# Patient Record
Sex: FEMALE | Race: WHITE
[De-identification: names, ages, dates, MRNs, and addresses within clinical notes are randomized per-mention and may not be internally consistent; named-entity substitution may affect disease eponyms.]

---

## 2022-06-29 ENCOUNTER — HOSPITAL ENCOUNTER (INPATIENT)
Dept: HOSPITAL 54 - ER | Age: 87
LOS: 5 days | DRG: 682 | End: 2022-07-04
Attending: INTERNAL MEDICINE | Admitting: INTERNAL MEDICINE
Payer: MEDICARE

## 2022-06-29 VITALS — BODY MASS INDEX: 23.22 KG/M2 | HEIGHT: 64 IN | WEIGHT: 136 LBS

## 2022-06-29 DIAGNOSIS — L89.896: ICD-10-CM

## 2022-06-29 DIAGNOSIS — R74.01: ICD-10-CM

## 2022-06-29 DIAGNOSIS — Z95.0: ICD-10-CM

## 2022-06-29 DIAGNOSIS — E86.0: ICD-10-CM

## 2022-06-29 DIAGNOSIS — N18.9: ICD-10-CM

## 2022-06-29 DIAGNOSIS — L89.523: ICD-10-CM

## 2022-06-29 DIAGNOSIS — Z79.899: ICD-10-CM

## 2022-06-29 DIAGNOSIS — M16.12: ICD-10-CM

## 2022-06-29 DIAGNOSIS — Z96.641: ICD-10-CM

## 2022-06-29 DIAGNOSIS — Z79.01: ICD-10-CM
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
## 2022-06-29 DIAGNOSIS — I27.20: ICD-10-CM

## 2022-06-29 DIAGNOSIS — E44.0: ICD-10-CM

## 2022-06-29 DIAGNOSIS — D64.9: ICD-10-CM

## 2022-06-29 DIAGNOSIS — I12.9: ICD-10-CM

## 2022-06-29 DIAGNOSIS — E78.5: ICD-10-CM

## 2022-06-29 DIAGNOSIS — K80.20: ICD-10-CM

## 2022-06-29 DIAGNOSIS — K76.1: ICD-10-CM

## 2022-06-29 DIAGNOSIS — Z91.81: ICD-10-CM

## 2022-06-29 DIAGNOSIS — G92.8: ICD-10-CM
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
## 2022-06-29 DIAGNOSIS — Z86.73: ICD-10-CM

## 2022-06-29 DIAGNOSIS — Z20.822: ICD-10-CM

## 2022-06-29 DIAGNOSIS — E83.52: ICD-10-CM

## 2022-06-29 DIAGNOSIS — I48.0: ICD-10-CM

## 2022-06-29 DIAGNOSIS — E88.09: ICD-10-CM

## 2022-06-29 DIAGNOSIS — G93.89: ICD-10-CM

## 2022-06-29 DIAGNOSIS — M62.561: ICD-10-CM

## 2022-06-29 DIAGNOSIS — L89.626: ICD-10-CM

## 2022-06-29 DIAGNOSIS — M71.20: ICD-10-CM

## 2022-06-29 DIAGNOSIS — N17.9: Primary | ICD-10-CM

## 2022-06-29 DIAGNOSIS — E80.6: ICD-10-CM

## 2022-06-29 DIAGNOSIS — I82.411: ICD-10-CM

## 2022-06-29 DIAGNOSIS — E87.0: ICD-10-CM

## 2022-06-29 DIAGNOSIS — K57.30: ICD-10-CM

## 2022-06-29 DIAGNOSIS — I21.A1: ICD-10-CM

## 2022-06-29 DIAGNOSIS — L89.616: ICD-10-CM

## 2022-06-29 DIAGNOSIS — K76.89: ICD-10-CM

## 2022-06-29 DIAGNOSIS — M62.562: ICD-10-CM

## 2022-06-29 LAB
ALBUMIN SERPL BCP-MCNC: 2.3 G/DL (ref 3.4–5)
ALP SERPL-CCNC: 131 U/L (ref 46–116)
ALT SERPL W P-5'-P-CCNC: 109 U/L (ref 12–78)
AST SERPL W P-5'-P-CCNC: 133 U/L (ref 15–37)
BASOPHILS # BLD AUTO: 0 K/UL (ref 0–0.2)
BASOPHILS NFR BLD AUTO: 0.2 % (ref 0–2)
BILIRUB DIRECT SERPL-MCNC: 5.5 MG/DL (ref 0–0.2)
BILIRUB SERPL-MCNC: 7.4 MG/DL (ref 0.2–1)
BILIRUB UR QL STRIP: (no result)
BUN SERPL-MCNC: 90 MG/DL (ref 7–18)
CALCIUM SERPL-MCNC: 10.9 MG/DL (ref 8.5–10.1)
CHLORIDE SERPL-SCNC: 117 MMOL/L (ref 98–107)
CO2 SERPL-SCNC: 28 MMOL/L (ref 21–32)
COLOR UR: (no result)
CREAT SERPL-MCNC: 2 MG/DL (ref 0.6–1.3)
EOSINOPHIL NFR BLD AUTO: 0 % (ref 0–6)
GLUCOSE SERPL-MCNC: 132 MG/DL (ref 74–106)
GLUCOSE UR STRIP-MCNC: NEGATIVE MG/DL
HCT VFR BLD AUTO: 39 % (ref 33–45)
HGB BLD-MCNC: 12.4 G/DL (ref 11.5–14.8)
LEUKOCYTE ESTERASE UR QL STRIP: NEGATIVE
LYMPHOCYTES NFR BLD AUTO: 0.9 K/UL (ref 0.8–4.8)
LYMPHOCYTES NFR BLD AUTO: 8 % (ref 20–44)
MCHC RBC AUTO-ENTMCNC: 32 G/DL (ref 31–36)
MCV RBC AUTO: 95 FL (ref 82–100)
MONOCYTES NFR BLD AUTO: 0.8 K/UL (ref 0.1–1.3)
MONOCYTES NFR BLD AUTO: 7.5 % (ref 2–12)
NEUTROPHILS # BLD AUTO: 9.5 K/UL (ref 1.8–8.9)
NEUTROPHILS NFR BLD AUTO: 84.3 % (ref 43–81)
NITRITE UR QL STRIP: NEGATIVE
PH UR STRIP: 5.5 [PH] (ref 5–8)
PLATELET # BLD AUTO: 229 K/UL (ref 150–450)
POTASSIUM SERPL-SCNC: 4.2 MMOL/L (ref 3.5–5.1)
PROT SERPL-MCNC: 7.1 G/DL (ref 6.4–8.2)
PROT UR QL STRIP: (no result) MG/DL
RBC # BLD AUTO: 4.12 MIL/UL (ref 4–5.2)
RBC #/AREA URNS HPF: (no result) /HPF (ref 0–2)
SODIUM SERPL-SCNC: 156 MMOL/L (ref 136–145)
UROBILINOGEN UR STRIP-MCNC: 0.2 EU/DL
WBC #/AREA URNS HPF: (no result) /HPF
WBC #/AREA URNS HPF: (no result) /HPF (ref 0–3)
WBC NRBC COR # BLD AUTO: 11.3 K/UL (ref 4.3–11)

## 2022-06-29 PROCEDURE — A6403 STERILE GAUZE>16 <= 48 SQ IN: HCPCS

## 2022-06-29 PROCEDURE — A6253 ABSORPT DRG > 48 SQ IN W/O B: HCPCS

## 2022-06-29 PROCEDURE — C9803 HOPD COVID-19 SPEC COLLECT: HCPCS

## 2022-06-29 PROCEDURE — G0378 HOSPITAL OBSERVATION PER HR: HCPCS

## 2022-06-29 NOTE — NUR
ATTEMPTED NURSING SWALLOW EVAL AT BEDSIDE W/ PATIENT'S DTR TRANSLATING FOR 
PATIENT. PT UNABLE TO SAFELY SWALLOW AS PT IS UNABLE TO FOLLOW COMMANDS. DR. MARTINEZ MADE AWARE.

## 2022-06-29 NOTE — NUR
TELE RN NOTE

RECEIVED PT FROM ER VIA riskmethodsHANY. ADMITTING DX GLF, FREEMAN, NSTEMI, FTT. PT IS DROWSY, AROUSABLE 
TO PAINFUL STIMULATION. NO DISTRESS OR DISCOMFORT NOTED. PT MOAN ON REPOSITION IN BED. PT ON 
O2 2L VIA N/C O2 %, KEPT HOB ELEVATED. SKIN ASSESSMENT DONE. PT HAS MULTIPLE SKIN AND 
BRUISES ON BODIES. PICTURES TAKEN AND PLACE THEM IN THE CHART. LT UPPER ARM IV SITE 
INFILTRATED. DC'D THE LINE AND SECURED THE SITE WITH 2X2 GAUZE. KEPT THE ARM ELEVATED. SL IN 
RT HAND #20 G INTACT AND PATENT. WOUND CONSULT PENDING. ADMITTING ORDERS CHECKED AND CARRIED 
OUT. INCONTINENCE CARE GIVEN. KEPT HER DRY AND CLEAN. ALL NEEDS ATTENDED. VSS. CONTINUE TO 
MONITOR HER.
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
## 2022-06-30 VITALS — DIASTOLIC BLOOD PRESSURE: 41 MMHG | SYSTOLIC BLOOD PRESSURE: 95 MMHG

## 2022-06-30 VITALS — SYSTOLIC BLOOD PRESSURE: 97 MMHG | DIASTOLIC BLOOD PRESSURE: 44 MMHG

## 2022-06-30 VITALS — DIASTOLIC BLOOD PRESSURE: 47 MMHG | SYSTOLIC BLOOD PRESSURE: 110 MMHG

## 2022-06-30 VITALS — DIASTOLIC BLOOD PRESSURE: 35 MMHG | SYSTOLIC BLOOD PRESSURE: 95 MMHG

## 2022-06-30 LAB
ALBUMIN SERPL BCP-MCNC: 1.7 G/DL (ref 3.4–5)
ALP SERPL-CCNC: 111 U/L (ref 46–116)
ALT SERPL W P-5'-P-CCNC: 99 U/L (ref 12–78)
AST SERPL W P-5'-P-CCNC: 95 U/L (ref 15–37)
BASOPHILS # BLD AUTO: 0 K/UL (ref 0–0.2)
BASOPHILS NFR BLD AUTO: 0 % (ref 0–2)
BILIRUB DIRECT SERPL-MCNC: 3.7 MG/DL (ref 0–0.2)
BILIRUB SERPL-MCNC: 4.5 MG/DL (ref 0.2–1)
BUN SERPL-MCNC: 94 MG/DL (ref 7–18)
CALCIUM SERPL-MCNC: 9.6 MG/DL (ref 8.5–10.1)
CHLORIDE SERPL-SCNC: 121 MMOL/L (ref 98–107)
CHOLEST SERPL-MCNC: 83 MG/DL (ref ?–200)
CO2 SERPL-SCNC: 26 MMOL/L (ref 21–32)
CREAT SERPL-MCNC: 1.9 MG/DL (ref 0.6–1.3)
EOSINOPHIL NFR BLD AUTO: 0.1 % (ref 0–6)
GLUCOSE SERPL-MCNC: 113 MG/DL (ref 74–106)
HCT VFR BLD AUTO: 34 % (ref 33–45)
HDLC SERPL-MCNC: 13 MG/DL (ref 40–60)
HGB BLD-MCNC: 10.9 G/DL (ref 11.5–14.8)
LDLC SERPL DIRECT ASSAY-MCNC: 44 MG/DL (ref 0–99)
LYMPHOCYTES NFR BLD AUTO: 0.8 K/UL (ref 0.8–4.8)
LYMPHOCYTES NFR BLD AUTO: 10.3 % (ref 20–44)
MAGNESIUM SERPL-MCNC: 2.5 MG/DL (ref 1.8–2.4)
MCHC RBC AUTO-ENTMCNC: 32 G/DL (ref 31–36)
MCV RBC AUTO: 94 FL (ref 82–100)
MONOCYTES NFR BLD AUTO: 0.6 K/UL (ref 0.1–1.3)
MONOCYTES NFR BLD AUTO: 8.2 % (ref 2–12)
NEUTROPHILS # BLD AUTO: 6.3 K/UL (ref 1.8–8.9)
NEUTROPHILS NFR BLD AUTO: 81.4 % (ref 43–81)
PHOSPHATE SERPL-MCNC: 3.9 MG/DL (ref 2.5–4.9)
PLATELET # BLD AUTO: 177 K/UL (ref 150–450)
POTASSIUM SERPL-SCNC: 4 MMOL/L (ref 3.5–5.1)
PROT SERPL-MCNC: 5.8 G/DL (ref 6.4–8.2)
RBC # BLD AUTO: 3.59 MIL/UL (ref 4–5.2)
SODIUM SERPL-SCNC: 155 MMOL/L (ref 136–145)
TRIGL SERPL-MCNC: 125 MG/DL (ref 30–150)
TSH SERPL DL<=0.005 MIU/L-ACNC: 0.15 UIU/ML (ref 0.36–3.74)
WBC NRBC COR # BLD AUTO: 7.7 K/UL (ref 4.3–11)

## 2022-06-30 RX ADMIN — APIXABAN SCH MG: 2.5 TABLET, FILM COATED ORAL at 18:05

## 2022-06-30 RX ADMIN — CLOTRIMAZOLE SCH GM: 1 CREAM TOPICAL at 17:55

## 2022-06-30 RX ADMIN — SODIUM HYPOCHLORITE SCH ML: 1.25 SOLUTION TOPICAL at 13:33

## 2022-06-30 RX ADMIN — SODIUM CHLORIDE PRN MLS/HR: 4.5 INJECTION, SOLUTION INTRAVENOUS at 14:49

## 2022-06-30 RX ADMIN — PANTOPRAZOLE SODIUM SCH MG: 40 TABLET, DELAYED RELEASE ORAL at 07:30

## 2022-06-30 RX ADMIN — SODIUM CHLORIDE PRN MLS/HR: 4.5 INJECTION, SOLUTION INTRAVENOUS at 00:05

## 2022-06-30 NOTE — NUR
RN NOTE



PATIENT WAS MORE AWAKE WITH FAMILY AT BEDSIDE. PATIENT WAS ABLE TO EAT AND TOLERATED 25% OF 
HER DINNER TRAY. PATIENT WAS ABLE TO TAKE HER 1700 MEDS, TOLERATED WELL.

## 2022-06-30 NOTE — NUR
Tele RN Opening Note

Pt received in bed, drowsy; would open eyes then fall back asleep; pt non-English speaking 
and is unable to answer when asked to verify her name and her . Pt is on 2L NC with O2sat 
92%; pt shows no s/s of resp distress, no SOB or cough, has non-labored and equal breathing. 
Pt attached to external monitor showing V-pacing with HR 69. Pt noted to have multiple 
wounds; will perform wound care as needed later in the shift. IV access on ROSALIO 20G and right 
hand 20G intact and patent; flushes easily with no resistance; D5W bolus has just completed 
and 1/2 NS was turned back on running at 100 ml/hr. Bed in lowest position, call light 
within reach, side rails up x3. Will continue to monitor throughout the night.
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
## 2022-06-30 NOTE — NUR
RN OPENING NOTE



RECEIVED PATIENT IN BED, SLEEPING. BREATHING EVEN AND UNLABORED. NO SIGNS OF ANY ACUTE 
DISTRESS NOTED AT THE TIME. ON O2 VIA NC AT 2LPM. PATIENT ON TELE MONITOR READING V-PACING 
WITH HR OF 70. NOTED WITH IV ACCESS ON RIGHT HAND #20G AND ROSALIO #20G, PATENT AND INTACT AND 
FLUSHED WELL. ALL SAFETY MEASURES IN PLACE. HOB ELEVATED WITH BED LOCKED IN LOWEST POSITION 
AND SIDE RAILS UP X 2. CALL LIGHT WITHIN REACH OF PATIENT. WILL CONTINUE TO MONITOR AND 
REASSESS PATIENT FOR ANY CHANGES DURING SHIFT.

## 2022-06-30 NOTE — NUR
WOUND CARE CONSULT: PT PRESENTS WITH MULTIPLE SKIN ISSUES AND WOUNDS INCLUDING MULTIPLE 
AREAS OF SKIN DISCOLORATION, LOWER EXTREMITY WOUNDS, NECROTIC DEEP TISSUE INJURIES IN 
EVOLUTION TO LEFT HIP AND LEFT SHOULDER, SACRAL SCAR WITH INCONTINENCE ASSOCIATED OPEN SKIN 
OVER SCAR, RASH TO BREASTRFOLDS AND PERINEUM, ALL PRESENT ON ADMISSION. PT IS INCONTINENT. 
RECOMMENDATIONS MADE FOR SKIN PROTECTION AND WOUND CARE. DISCUSSED WITH NURSING STAFF. 
SURGICAL CONSULT CALLED TO DR RENNY WILKERSON MD IN AGREEMENT WITH PLAN OF CARE. 

-------------------------------------------------------------------------------

Addendum: 06/30/22 at 1216 by LUDMILA VALLE WNDNU

-------------------------------------------------------------------------------

ADDITIONAL: PODIATRY CONSULT CALLED TO DR ÁLVAREZ FOR LOWER EXTREMITIES.

## 2022-06-30 NOTE — NUR
RN NOTE



PATIENT VERY DROWSY. BREAKFAST TRAY NOT GIVEN. AM MEDS HELD DUE TO DROWSINESS. MD INFORMED 
WITH ORDER FOR SWALLOW EVALUATION.

## 2022-06-30 NOTE — NUR
RN OPENING NOTE



NO SIGNIFICANT CHANGES IN PATIENT CONDITION THROUGHOUT SHIFT. PATIENT IN BED, AWAKE, WITH 
FAMILY AT BEDSIDE. BREATHING EVEN AND UNLABORED. NO SIGNS OF ANY ACUTE DISTRESS NOTED AT THE 
TIME. ON O2 VIA NC AT 2LPM NOTED WITH IV ACCESS ON RIGHT HAND #20G AND ROSALIO #20G, PATENT AND 
INTACT AND FLUSHED WELL. WOUND CARE RENDERED AND TOLERATED WELL. KEPT PATIENT CLEAN DRY AND 
COMFORTABLE, TURNED AND REPOSITION Q2HRS. ALL SAFETY MEASURES IN PLACE. HOB ELEVATED WITH 
BED LOCKED IN LOWEST POSITION AND SIDE RAILS UP X 2. CALL LIGHT WITHIN REACH OF PATIENT. 
WILL ENDORSE TO NIGHT SHIFT NURSE FOR CONTINUITY OF CARE.

-------------------------------------------------------------------------------

Addendum: 06/30/22 at 1843 by DAYANNA WHIPPLE RN

-------------------------------------------------------------------------------

RN CLOSING NOTES

## 2022-07-01 VITALS — DIASTOLIC BLOOD PRESSURE: 40 MMHG | SYSTOLIC BLOOD PRESSURE: 96 MMHG

## 2022-07-01 VITALS — SYSTOLIC BLOOD PRESSURE: 91 MMHG | DIASTOLIC BLOOD PRESSURE: 60 MMHG

## 2022-07-01 VITALS — SYSTOLIC BLOOD PRESSURE: 66 MMHG | DIASTOLIC BLOOD PRESSURE: 33 MMHG

## 2022-07-01 VITALS — SYSTOLIC BLOOD PRESSURE: 151 MMHG | DIASTOLIC BLOOD PRESSURE: 60 MMHG

## 2022-07-01 VITALS — SYSTOLIC BLOOD PRESSURE: 99 MMHG | DIASTOLIC BLOOD PRESSURE: 39 MMHG

## 2022-07-01 VITALS — SYSTOLIC BLOOD PRESSURE: 105 MMHG | DIASTOLIC BLOOD PRESSURE: 56 MMHG

## 2022-07-01 LAB
ALBUMIN SERPL BCP-MCNC: 1.7 G/DL (ref 3.4–5)
ALP SERPL-CCNC: 119 U/L (ref 46–116)
ALT SERPL W P-5'-P-CCNC: 119 U/L (ref 12–78)
AST SERPL W P-5'-P-CCNC: 182 U/L (ref 15–37)
BASOPHILS # BLD AUTO: 0 K/UL (ref 0–0.2)
BASOPHILS NFR BLD AUTO: 0.1 % (ref 0–2)
BILIRUB SERPL-MCNC: 8 MG/DL (ref 0.2–1)
BUN SERPL-MCNC: 96 MG/DL (ref 7–18)
CALCIUM SERPL-MCNC: 9.4 MG/DL (ref 8.5–10.1)
CHLORIDE SERPL-SCNC: 118 MMOL/L (ref 98–107)
CO2 SERPL-SCNC: 25 MMOL/L (ref 21–32)
CREAT SERPL-MCNC: 2 MG/DL (ref 0.6–1.3)
EOSINOPHIL NFR BLD AUTO: 0.1 % (ref 0–6)
GLUCOSE SERPL-MCNC: 134 MG/DL (ref 74–106)
HCT VFR BLD AUTO: 35 % (ref 33–45)
HGB BLD-MCNC: 11.2 G/DL (ref 11.5–14.8)
LYMPHOCYTES NFR BLD AUTO: 0.9 K/UL (ref 0.8–4.8)
LYMPHOCYTES NFR BLD AUTO: 10.8 % (ref 20–44)
MAGNESIUM SERPL-MCNC: 2.6 MG/DL (ref 1.8–2.4)
MCHC RBC AUTO-ENTMCNC: 32 G/DL (ref 31–36)
MCV RBC AUTO: 96 FL (ref 82–100)
MONOCYTES NFR BLD AUTO: 0.7 K/UL (ref 0.1–1.3)
MONOCYTES NFR BLD AUTO: 7.6 % (ref 2–12)
NEUTROPHILS # BLD AUTO: 7.1 K/UL (ref 1.8–8.9)
NEUTROPHILS NFR BLD AUTO: 81.4 % (ref 43–81)
PHOSPHATE SERPL-MCNC: 3.1 MG/DL (ref 2.5–4.9)
PLATELET # BLD AUTO: 160 K/UL (ref 150–450)
POTASSIUM SERPL-SCNC: 4.3 MMOL/L (ref 3.5–5.1)
PROT SERPL-MCNC: 5.8 G/DL (ref 6.4–8.2)
RBC # BLD AUTO: 3.64 MIL/UL (ref 4–5.2)
SODIUM SERPL-SCNC: 152 MMOL/L (ref 136–145)
WBC NRBC COR # BLD AUTO: 8.7 K/UL (ref 4.3–11)

## 2022-07-01 RX ADMIN — APIXABAN SCH MG: 2.5 TABLET, FILM COATED ORAL at 08:30

## 2022-07-01 RX ADMIN — CLOTRIMAZOLE SCH GM: 1 CREAM TOPICAL at 09:00

## 2022-07-01 RX ADMIN — PANTOPRAZOLE SODIUM SCH MG: 40 TABLET, DELAYED RELEASE ORAL at 07:30

## 2022-07-01 RX ADMIN — SODIUM HYPOCHLORITE SCH ML: 1.25 SOLUTION TOPICAL at 09:00

## 2022-07-01 RX ADMIN — APIXABAN SCH MG: 2.5 TABLET, FILM COATED ORAL at 16:33

## 2022-07-01 RX ADMIN — SODIUM CHLORIDE PRN MLS/HR: 4.5 INJECTION, SOLUTION INTRAVENOUS at 03:53

## 2022-07-01 RX ADMIN — CLOTRIMAZOLE SCH GM: 1 CREAM TOPICAL at 16:32

## 2022-07-01 RX ADMIN — SODIUM CHLORIDE PRN MLS/HR: 9 INJECTION, SOLUTION INTRAVENOUS at 16:09

## 2022-07-01 RX ADMIN — SODIUM CHLORIDE PRN MLS/HR: 4.5 INJECTION, SOLUTION INTRAVENOUS at 15:26

## 2022-07-01 NOTE — NUR
RN Opening Note



Received patient report from nightshift. Patient is asleep. Currently on 2 Liters oxygen via 
nasal canula with O2sat ranging from 92%-100%. No signs or symptoms of respiratory distress, 
no Shortness Of Breath or cough, has non-labored and equal breathing. Patient attached to 
external monitor showing V-pacing with HR 69-70. IV access noted on Left Upper Arm 20 Gauge 
and right hand 20 Gauge intact and patent; flushes easily with no resistance; 1/2 NS running 
at 100 ml/hr. Bed in lowest position, call light within reach, side rails up x3. Will 
continue plan of care and anticipate needs.

## 2022-07-01 NOTE — NUR
RN NOTES



RECEIVED PT FOR CONTINUITY OF CARE. PATIENT A/OX0 IN NO S/SX OF ACUTE DISTRESS AT THIS TIME; 
CURRENTLY ON 2L OF 02 VIA NC, WITH 02 SAT >95% AT THIS TIME.WITH IV ACCESS PATENT, INTACT 
AND FLUSHING WELL. WITH  RUNNING NS@100CC/HR. WILL ENSURE SAFETY MEASURES WITHIN THE SHIFT. 
PATIENT BED ALARM IS ON. HEAD OF BED ELEVATED. BED IS LOCKED,  IN LOWEST POSITION AND SIDE 
RAILS UP. CALL LIGHT WITHIN REACH OF THE PATIENT. WILL CONTINUE TO MONITOR AND REASSESS FOR 
ANY CHANGES AND WILL CARRY OUT ANY ONGOING AND ACTIVE MD ORDER.

## 2022-07-01 NOTE — NUR
Tele RN Closing Note

Pt's neuro status still the same; still drowsy, would open eyes then fall back asleep. 
Attempted to give pt sip of water but would look at me, then close eyes. Pt remains on 2L NC 
with O2sat ranging from 92%-100%; pt has no s/s of resp distress, no SOB or cough, has 
non-labored and equal breathing. Pt attached to external monitor showing V-pacing with HR 
69-70. Gave pt bed bath and changed wound dressings. IV access on ROSALIO 20G and right hand 20G 
intact and patent; flushes easily with no resistance; 1/2 NS running at 100 ml/hr. Pt 
provided cooling measures by removing blankets and applying ice packs. Bed in lowest 
position, call light within reach, side rails up x3. Will endorse to dayshift nurse to 
continue care.

## 2022-07-01 NOTE — NUR
HELP PATIENTS 1700 DOSE OF APIXABAN. PATIENT FAILED SWALLOW EVALUATION AND IS NOT ALERT TO 
TAKE PO MEDICATIONS. WILL CONTINUE PLAN OF CARE AND ANTICIPATE NEEDS.

## 2022-07-01 NOTE — NUR
RN NOTES



LATEST BLOOD PRESSURE AT 96/45, MD AWARE. PER DR LAWSON, CONTINUE IV FLUIDS AT ORDERED RATE.  
WILL CONTINUE PLAN OF CARE AND ANTICIPATE NEEDS.

## 2022-07-01 NOTE — NUR
RN CLOSING NOTE 



PATIENT REMAINS IN BED ALERT AND ORIENTED TIMES ZERO. AROUSABLE TO PAINFUL STIMULI. OXYGEN 
SATURATION  PERCENT. PATIENT IS SNORING ASLEEP. WOUNDS ARE DOCUMENTED AND PHOTOGRAPHED 
IN THE PHYSICAL CHART. PATIENT HAS DIAPER. ORAL MEDICATIONS HELD DUE TO PATIENT FAILING THE 
SWALLOW EVALUATION. IV ACCESS NOTED ON RIGHT HAND 20 GAUGE RUNNING NORMAL SALINE  
MLS/HR. IV ACCESS NOTED ON LEFT UPPER ARM 20 GAUGE. KEPT CLEAN AND DRY THROUGHOUT SHIFT. BED 
IN LOWEST POSITION, CALL LIGHT WITHIN REACH SIDE RAILS UP. WILL ENDORSE TO NIGHTSHIFT RN FOR 
CONTINUATION OF CARE.

## 2022-07-02 VITALS — DIASTOLIC BLOOD PRESSURE: 43 MMHG | SYSTOLIC BLOOD PRESSURE: 101 MMHG

## 2022-07-02 VITALS — DIASTOLIC BLOOD PRESSURE: 48 MMHG | SYSTOLIC BLOOD PRESSURE: 93 MMHG

## 2022-07-02 VITALS — DIASTOLIC BLOOD PRESSURE: 74 MMHG | SYSTOLIC BLOOD PRESSURE: 96 MMHG

## 2022-07-02 VITALS — SYSTOLIC BLOOD PRESSURE: 134 MMHG | DIASTOLIC BLOOD PRESSURE: 51 MMHG

## 2022-07-02 VITALS — DIASTOLIC BLOOD PRESSURE: 82 MMHG | SYSTOLIC BLOOD PRESSURE: 103 MMHG

## 2022-07-02 VITALS — SYSTOLIC BLOOD PRESSURE: 118 MMHG | DIASTOLIC BLOOD PRESSURE: 80 MMHG

## 2022-07-02 LAB
ALBUMIN SERPL BCP-MCNC: 1.6 G/DL (ref 3.4–5)
ALP SERPL-CCNC: 122 U/L (ref 46–116)
ALT SERPL W P-5'-P-CCNC: 124 U/L (ref 12–78)
AST SERPL W P-5'-P-CCNC: 147 U/L (ref 15–37)
BASOPHILS # BLD AUTO: 0 K/UL (ref 0–0.2)
BASOPHILS NFR BLD AUTO: 0.1 % (ref 0–2)
BILIRUB SERPL-MCNC: 7.1 MG/DL (ref 0.2–1)
BUN SERPL-MCNC: 101 MG/DL (ref 7–18)
CALCIUM SERPL-MCNC: 9.1 MG/DL (ref 8.5–10.1)
CHLORIDE SERPL-SCNC: 120 MMOL/L (ref 98–107)
CO2 SERPL-SCNC: 22 MMOL/L (ref 21–32)
CREAT SERPL-MCNC: 2.2 MG/DL (ref 0.6–1.3)
EOSINOPHIL NFR BLD AUTO: 0 % (ref 0–6)
GLUCOSE SERPL-MCNC: 106 MG/DL (ref 74–106)
HCT VFR BLD AUTO: 33 % (ref 33–45)
HGB BLD-MCNC: 10.9 G/DL (ref 11.5–14.8)
LYMPHOCYTES NFR BLD AUTO: 0.5 K/UL (ref 0.8–4.8)
LYMPHOCYTES NFR BLD AUTO: 6.2 % (ref 20–44)
MAGNESIUM SERPL-MCNC: 2.4 MG/DL (ref 1.8–2.4)
MCHC RBC AUTO-ENTMCNC: 33 G/DL (ref 31–36)
MCV RBC AUTO: 94 FL (ref 82–100)
MONOCYTES NFR BLD AUTO: 0.5 K/UL (ref 0.1–1.3)
MONOCYTES NFR BLD AUTO: 6.4 % (ref 2–12)
NEUTROPHILS # BLD AUTO: 7.2 K/UL (ref 1.8–8.9)
NEUTROPHILS NFR BLD AUTO: 87.3 % (ref 43–81)
PHOSPHATE SERPL-MCNC: 3.9 MG/DL (ref 2.5–4.9)
PLATELET # BLD AUTO: 156 K/UL (ref 150–450)
POTASSIUM SERPL-SCNC: 4.5 MMOL/L (ref 3.5–5.1)
PROT SERPL-MCNC: 5.9 G/DL (ref 6.4–8.2)
RBC # BLD AUTO: 3.54 MIL/UL (ref 4–5.2)
SODIUM SERPL-SCNC: 153 MMOL/L (ref 136–145)
WBC NRBC COR # BLD AUTO: 8.2 K/UL (ref 4.3–11)

## 2022-07-02 RX ADMIN — SODIUM CHLORIDE PRN MLS/HR: 9 INJECTION, SOLUTION INTRAVENOUS at 03:28

## 2022-07-02 RX ADMIN — SODIUM CHLORIDE PRN MLS/HR: 9 INJECTION, SOLUTION INTRAVENOUS at 23:48

## 2022-07-02 RX ADMIN — CLOTRIMAZOLE SCH GM: 1 CREAM TOPICAL at 17:11

## 2022-07-02 RX ADMIN — CLOTRIMAZOLE SCH GM: 1 CREAM TOPICAL at 09:56

## 2022-07-02 RX ADMIN — PANTOPRAZOLE SODIUM SCH MG: 40 TABLET, DELAYED RELEASE ORAL at 07:30

## 2022-07-02 RX ADMIN — APIXABAN SCH MG: 2.5 TABLET, FILM COATED ORAL at 09:00

## 2022-07-02 RX ADMIN — HEPARIN SODIUM PRN MLS/HR: 5000 INJECTION, SOLUTION INTRAVENOUS at 12:02

## 2022-07-02 RX ADMIN — SODIUM HYPOCHLORITE SCH ML: 1.25 SOLUTION TOPICAL at 09:55

## 2022-07-02 NOTE — NUR
RN CLOSING NOTE: 



PATIENT REMAINS IN ROOM IN NO SIGNS OF RESPIRATORY DISTRESS, PATIENT STILL ON 2L OF 02 VIA 
NC ;TOLERATING WELL SATURATING @ >95% SP02. SAFETY MEASURES IMPLEMENTED, BED IN LOWEST 
POSITION, LOCKED, SIDE RAILS UP, CALL LIGHT WITHIN REACH. ALL NEEDS AND ORDERS ADDRESSED 
DURING THE SHIFT. IV ACCESS MAINTAINED INTACT, SECURED AND FLUSHING WELL.  ALL DUE MEDS 
GIVEN AS ORDERED & SCHEDULED ; PATIENT TOLERATED WELL. PATIENT KEPT CLEAN AND COMFORTABLE 
WITHIN THE SHIFT. PATIENT ENDORSED TO INCOMING SHIFT RN WITH STABLE VITAL SIGN AND FOR 
CONTINUITY OF CARE.

## 2022-07-02 NOTE — NUR
RN NOTES



RECEIVED PT FOR CONTINUITY OF CARE. PATIENT A/OX0 IN NO S/SX OF ACUTE DISTRESS AT THIS TIME; 
CURRENTLY ON 2L OF 02 VIA NC,AT THIS TIME.WITH IV ACCESS PATENT, INTACT AND FLUSHING WELL. 
WITH  RUNNING NS@100CC/HR. WILL ENSURE SAFETY MEASURES WITHIN THE SHIFT. PATIENT BED ALARM 
IS ON. HEAD OF BED ELEVATED. BED IS LOCKED,  IN LOWEST POSITION AND SIDE RAILS UP. CALL 
LIGHT WITHIN REACH OF THE PATIENT.

## 2022-07-02 NOTE — NUR
RN CLOSING NOTE: 



PATIENT REMAINS IN ROOM IN NO SIGNS OF RESPIRATORY DISTRESS, PATIENT STILL ON 2L OF 02 VIA 
NC ;TOLERATING WELL SATURATING @ >95% SP02. SAFETY MEASURES IMPLEMENTED, BED IN LOWEST 
POSITION, LOCKED, SIDE RAILS UP, CALL LIGHT WITHIN REACH. ALL NEEDS AND ORDERS ADDRESSED 
DURING THE SHIFT. IV ACCESS MAINTAINED INTACT, SECURED AND FLUSHING WELL. PATIENT IS 
CURRENTLY ON HEPARIN DRIP A. PATIENT KEPT CLEAN AND COMFORTABLE WITHIN THE SHIFT. PATIENT 
ENDORSED TO INCOMING SHIFT RN.

## 2022-07-02 NOTE — NUR
RN NOTES



RECEIVED PT FOR CONTINUITY OF CARE. PATIENT A/OX0 IN NO S/SX OF ACUTE DISTRESS AT THIS TIME; 
CURRENTLY ON 2L OF 02 VIA NC, WITH 02 SAT >95% AT THIS TIME.WITH IV ACCESS PATENT, INTACT 
AND FLUSHING WELL. RECEIVED WITH  RUNNING HEPARIN DRIP @1350UNITS/HR  PER NON ACS PROTOCOL 
AS ORDERED AND  NS@100CC/HR. WILL ENSURE SAFETY MEASURES WITHIN THE SHIFT. PATIENT BED ALARM 
IS ON. HEAD OF BED ELEVATED. BED IS LOCKED,  IN LOWEST POSITION AND SIDE RAILS UP. CALL 
LIGHT WITHIN REACH OF THE PATIENT. WILL CONTINUE TO MONITOR AND REASSESS FOR ANY CHANGES AND 
WILL CARRY OUT ANY ONGOING AND ACTIVE MD ORDER.

## 2022-07-02 NOTE — NUR
RN NOTE 



PER SPEECH THERAPIST EVALUATION PATIENT IS NOT ALERT ENOUGH TO PASS SWALLOW EVALUATION 
RECOMMENDATION IS FOR PATIENT TO BE NPO UNTIL AN INCREASED LEVEL ON CONSCIOUSNESS. ASSESSED 
PATIENT THIS MORNING LOC HAS NOT INCREASED WILL HOLD PO MEDICATION AT THIS TIME, WILL INFORM 
PROVIDER IF IT IS POSSIBLE TO CHANGE PO MEDICATIONS TO IV.

## 2022-07-02 NOTE — NUR
RN NOTES



PICC LINE NURSE (DEWEY SCOTT) FACILITATED INSERTION OF MIDLINE @ R UA G#18, SECURED , INTACT AND 
FLUSHING WELL. CHARGE RN WELL AWARE.

## 2022-07-02 NOTE — NUR
RN NOTE 



PATIENTS APPT RESULT 34.7. PER POLICY INCREASE HEPARIN DRIP 4 UNITS. CURRENT DRIP IS 1350 
UNITS/HR = RATE IS 27MLS/HR

## 2022-07-02 NOTE — NUR
RN NOTE 



PER DOCTOR RENNY VASQUEZ TO SWITCH PATIENTS NPO MEDICATION TO IV DUE TO PATIENT FAILING SWALLOW 
EVAL DUE TO DECREASED LOC.

## 2022-07-03 VITALS — SYSTOLIC BLOOD PRESSURE: 90 MMHG | DIASTOLIC BLOOD PRESSURE: 43 MMHG

## 2022-07-03 VITALS — DIASTOLIC BLOOD PRESSURE: 25 MMHG | SYSTOLIC BLOOD PRESSURE: 109 MMHG

## 2022-07-03 VITALS — SYSTOLIC BLOOD PRESSURE: 116 MMHG | DIASTOLIC BLOOD PRESSURE: 45 MMHG

## 2022-07-03 VITALS — SYSTOLIC BLOOD PRESSURE: 96 MMHG | DIASTOLIC BLOOD PRESSURE: 40 MMHG

## 2022-07-03 VITALS — DIASTOLIC BLOOD PRESSURE: 68 MMHG | SYSTOLIC BLOOD PRESSURE: 127 MMHG

## 2022-07-03 VITALS — DIASTOLIC BLOOD PRESSURE: 51 MMHG | SYSTOLIC BLOOD PRESSURE: 169 MMHG

## 2022-07-03 LAB
ALBUMIN SERPL BCP-MCNC: 1.4 G/DL (ref 3.4–5)
ALP SERPL-CCNC: 127 U/L (ref 46–116)
ALT SERPL W P-5'-P-CCNC: 172 U/L (ref 12–78)
AST SERPL W P-5'-P-CCNC: 223 U/L (ref 15–37)
BASOPHILS # BLD AUTO: 0 K/UL (ref 0–0.2)
BASOPHILS NFR BLD AUTO: 0 % (ref 0–2)
BILIRUB SERPL-MCNC: 9.9 MG/DL (ref 0.2–1)
BUN SERPL-MCNC: 110 MG/DL (ref 7–18)
CALCIUM SERPL-MCNC: 9 MG/DL (ref 8.5–10.1)
CHLORIDE SERPL-SCNC: 124 MMOL/L (ref 98–107)
CO2 SERPL-SCNC: 22 MMOL/L (ref 21–32)
CREAT SERPL-MCNC: 2.3 MG/DL (ref 0.6–1.3)
EOSINOPHIL NFR BLD AUTO: 0 % (ref 0–6)
GLUCOSE SERPL-MCNC: 151 MG/DL (ref 74–106)
HCT VFR BLD AUTO: 34 % (ref 33–45)
HGB BLD-MCNC: 10.8 G/DL (ref 11.5–14.8)
LYMPHOCYTES NFR BLD AUTO: 0.6 K/UL (ref 0.8–4.8)
LYMPHOCYTES NFR BLD AUTO: 6.6 % (ref 20–44)
MAGNESIUM SERPL-MCNC: 2.5 MG/DL (ref 1.8–2.4)
MCHC RBC AUTO-ENTMCNC: 32 G/DL (ref 31–36)
MCV RBC AUTO: 97 FL (ref 82–100)
MONOCYTES NFR BLD AUTO: 0.4 K/UL (ref 0.1–1.3)
MONOCYTES NFR BLD AUTO: 5.1 % (ref 2–12)
NEUTROPHILS # BLD AUTO: 7.6 K/UL (ref 1.8–8.9)
NEUTROPHILS NFR BLD AUTO: 88.3 % (ref 43–81)
PHOSPHATE SERPL-MCNC: 5.2 MG/DL (ref 2.5–4.9)
PLATELET # BLD AUTO: 188 K/UL (ref 150–450)
POTASSIUM SERPL-SCNC: 4.7 MMOL/L (ref 3.5–5.1)
PROT SERPL-MCNC: 5.8 G/DL (ref 6.4–8.2)
RBC # BLD AUTO: 3.48 MIL/UL (ref 4–5.2)
SODIUM SERPL-SCNC: 156 MMOL/L (ref 136–145)
WBC NRBC COR # BLD AUTO: 8.6 K/UL (ref 4.3–11)

## 2022-07-03 RX ADMIN — SODIUM HYPOCHLORITE SCH ML: 1.25 SOLUTION TOPICAL at 09:11

## 2022-07-03 RX ADMIN — CLOTRIMAZOLE SCH GM: 1 CREAM TOPICAL at 09:11

## 2022-07-03 RX ADMIN — DEXTROSE AND SODIUM CHLORIDE PRN MLS/HR: 5; 450 INJECTION, SOLUTION INTRAVENOUS at 13:02

## 2022-07-03 RX ADMIN — CLOTRIMAZOLE SCH GM: 1 CREAM TOPICAL at 16:17

## 2022-07-03 RX ADMIN — PANTOPRAZOLE SODIUM SCH MG: 40 TABLET, DELAYED RELEASE ORAL at 07:30

## 2022-07-03 RX ADMIN — DEXTROSE AND SODIUM CHLORIDE PRN MLS/HR: 5; 450 INJECTION, SOLUTION INTRAVENOUS at 23:48

## 2022-07-03 RX ADMIN — HEPARIN SODIUM PRN MLS/HR: 5000 INJECTION, SOLUTION INTRAVENOUS at 10:54

## 2022-07-03 NOTE — NUR
TELE RN NOTE

 PATIENT IN BED,  ON IVF AS ORDERED , ON HEPARIN DRIP AS ORDERED, BED IN LOWEST AND LOCKED 
POSITION , CALL LIGHT WITHIN REACH, WILL CONT TO MONITOR CLOSELY .OPEN EYES WHEN CALL HER 
NAME , ON RA NO SOB NOTED AT THIS TIME,

## 2022-07-03 NOTE — NUR
RN NOTES



RESTARTED HEPARIN DRIP NOW AT 1150UNITS; WAS HELD FOR ONE HOUR THEN DECREASE BY 3 
UNITS/KG/H=106Z/H.  [id number]. REDRAW FOR PTT AFTER 6HOURS, 10AM DUE. WILL CONTINUE TO 
ASSESS AND MONITOR THROUGHOUT THE SHIFT.

## 2022-07-03 NOTE — NUR
tele rn note

 dr vargas notified that bun 110 na 156 with order d51\2 ns at 100 ml per hour will f\u

## 2022-07-03 NOTE — NUR
per protocol hold heparin 1 hour and decreased by 3 units 

will start at 950 units/hr

ptt will be drawn at 1830

## 2022-07-03 NOTE — NUR
RN CLOSING NOTE: 



PATIENT REMAINS IN ROOM IN NO SIGNS OF RESPIRATORY DISTRESS, PATIENT STILL ON 2L OF 02 VIA 
NC ;TOLERATING WELL SATURATING @ >95% SP02. STILL WITH RUNNING HEPARIN DRIP @1150UNITS/HR , 
NEXT PTT DUE @1000AM. SAFETY MEASURES IMPLEMENTED, BED IN LOWEST POSITION, LOCKED, SIDE 
RAILS UP, CALL LIGHT WITHIN REACH. ALL NEEDS AND ORDERS ADDRESSED DURING THE SHIFT. IV 
ACCESS MAINTAINED INTACT, SECURED AND FLUSHING WELL. ALL DUE MEDS GIVEN AS ORDERED & 
SCHEDULED ; PATIENT TOLERATED WELL. PATIENT KEPT CLEAN AND COMFORTABLE WITHIN THE SHIFT. 
PATIENT ENDORSED TO INCOMING SHIFT RN WITH STABLE VITAL SIGN AND FOR CONTINUITY OF CARE.

## 2022-07-03 NOTE — NUR
RN NOTe

phlebotomist at bedside.tried to draw lab for ptt, but unable to get it. called lab and 
stated something will come to redraw blood. will followup. notified MD about left swollen 
arm. ordered ultrasound.

## 2022-07-03 NOTE — NUR
TELE RN NOTE



ENDORSED TO SONIA REDDY that PTT drawn at phlebotomist. someone is going to come up soon

## 2022-07-03 NOTE — NUR
RN CLOSING NOTE: 



PATIENT IS CONFUSED AND DISORIENTED. PATIENT IS ON 2L OF 02 VIA NASAL CANNULA. PATIENT 
OXYGEN SATURATION 98% . PATIENT SHOWS NO SIGNS OF DISTRESS OR PAIN. IV SITE FLUSHING WELL. 
PATIENT IS ON HEPARIN DRIP IS CURRENTLY RUNNING 950 UNITS/HR. PATIENT KEPT CLEAN AND 
COMFORTABLE THROUGHOUT SHIFT. SAFETY MEASURES IN PLACE. BED LOCKED IN LOWEST POSITION, SIDE 
RAILS UP X2, CALL LIGHT WITHIN REACH. BEDSIDE TABLE WITHIN REACH. FAMILY WAS AT BEDSIDE

## 2022-07-03 NOTE — NUR
RN NOTES



@0230 RECEIVED CALL FROM LAB, SPOKE WITH ALLY. APTT RESULT : 131.8, RN ACKNOWLEDGED. WILL 
CHECK HEPARIN PROTOCOL PER NON ACS. 

-PER PROTOCOL, CRITERIA G. OVER 130 SECONDS, CALL PHYSICIAN.  WILL NOTIFY RICO BURRIS 
(DEWEY DE DIOS). CHARGE RN MADE AWARE. 



@0245 NOTIFIED RICO BURRIS (LANRE,DEWEY) MD ACKNOWLEDGED. AWAITING FOR ORDERS.  PER RICO BURRIS 
(DEWEY DE DIOS) FOLLOW PROTOCOL UNDER CRITERIA F. , HOLD HEPARIN FOR ONE HOUR THEN DECREASE 
BY 3 UNITS/KG/Z=956O/H.  [id number]. WILL CARRY OUT PER MD ORDER AND BY FOLLOWING HEPARIN 
PROTOCOL FOR NON ACS. CHARGE RN MADE AWARE.

## 2022-07-04 VITALS — DIASTOLIC BLOOD PRESSURE: 33 MMHG | SYSTOLIC BLOOD PRESSURE: 99 MMHG

## 2022-07-04 PROCEDURE — 5A2204Z RESTORATION OF CARDIAC RHYTHM, SINGLE: ICD-10-PCS | Performed by: EMERGENCY MEDICINE

## 2022-07-04 PROCEDURE — 5A1935Z RESPIRATORY VENTILATION, LESS THAN 24 CONSECUTIVE HOURS: ICD-10-PCS | Performed by: EMERGENCY MEDICINE

## 2022-07-04 NOTE — NUR
CODE BLUE CALLED ON PATIENT AFTER BEING FOUND UNRESPONSIVE AND NOT BREATHING. CPR WAS 
INITIATED WHILE M.D. INTUBATED PATIENT. DESPITE ALL EFFORTS TO REVIVE PATIENT, NO ROSC AND 
ROSB WAS DETECTED AND PATIENT WAS PRONOUNCED. REFER TO CODE BLUE SHEET.

-------------------------------------------------------------------------------

Addendum: 07/04/22 at 0546 by MARLA ERNST RT

-------------------------------------------------------------------------------

Amended: Links added.

## 2022-07-04 NOTE — NUR
TELE-1/RN



WAS CALLED TO PT ROOM BY MONITOR TECH. PT WAS NOTED TO BE NOT BREATHING AND PULSELESS. CODE 
BLUE CALLED AND CPR INITIATED. REFER TO CODE BLUE SHEET.
